# Patient Record
Sex: FEMALE | Race: WHITE | NOT HISPANIC OR LATINO | Employment: OTHER | URBAN - NONMETROPOLITAN AREA
[De-identification: names, ages, dates, MRNs, and addresses within clinical notes are randomized per-mention and may not be internally consistent; named-entity substitution may affect disease eponyms.]

---

## 2022-09-13 ENCOUNTER — APPOINTMENT (OUTPATIENT)
Dept: CT IMAGING | Facility: OTHER | Age: 72
End: 2022-09-13
Attending: PHYSICIAN ASSISTANT
Payer: COMMERCIAL

## 2022-09-13 ENCOUNTER — HOSPITAL ENCOUNTER (EMERGENCY)
Facility: OTHER | Age: 72
Discharge: HOME | End: 2022-09-13
Attending: PHYSICIAN ASSISTANT | Admitting: PHYSICIAN ASSISTANT
Payer: COMMERCIAL

## 2022-09-13 VITALS
BODY MASS INDEX: 29.91 KG/M2 | SYSTOLIC BLOOD PRESSURE: 175 MMHG | TEMPERATURE: 98.2 F | WEIGHT: 179.5 LBS | HEART RATE: 68 BPM | DIASTOLIC BLOOD PRESSURE: 90 MMHG | RESPIRATION RATE: 22 BRPM | HEIGHT: 65 IN | OXYGEN SATURATION: 99 %

## 2022-09-13 DIAGNOSIS — S09.90XA CLOSED HEAD INJURY, INITIAL ENCOUNTER: ICD-10-CM

## 2022-09-13 DIAGNOSIS — S00.83XA TRAUMATIC HEMATOMA OF FOREHEAD, INITIAL ENCOUNTER: ICD-10-CM

## 2022-09-13 DIAGNOSIS — R41.3 MEMORY LOSS: ICD-10-CM
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
## 2022-09-13 PROBLEM — I10 HTN (HYPERTENSION): Status: ACTIVE | Noted: 2020-06-14

## 2022-09-13 LAB
ALBUMIN SERPL-MCNC: 3.9 G/DL (ref 3.5–5.7)
ALP SERPL-CCNC: 89 U/L (ref 34–104)
ALT SERPL W P-5'-P-CCNC: 32 U/L (ref 7–52)
ANION GAP SERPL CALCULATED.3IONS-SCNC: 7 MMOL/L (ref 3–14)
AST SERPL W P-5'-P-CCNC: 26 U/L (ref 13–39)
BASOPHILS # BLD AUTO: 0 10E3/UL (ref 0–0.2)
BASOPHILS NFR BLD AUTO: 1 %
BILIRUB SERPL-MCNC: 0.6 MG/DL (ref 0.3–1)
BUN SERPL-MCNC: 15 MG/DL (ref 7–25)
CALCIUM SERPL-MCNC: 9.2 MG/DL (ref 8.6–10.3)
CHLORIDE BLD-SCNC: 109 MMOL/L (ref 98–107)
CO2 SERPL-SCNC: 27 MMOL/L (ref 21–31)
CREAT SERPL-MCNC: 0.79 MG/DL (ref 0.6–1.2)
CRP SERPL-MCNC: 7.2 MG/L
EOSINOPHIL # BLD AUTO: 0.3 10E3/UL (ref 0–0.7)
EOSINOPHIL NFR BLD AUTO: 6 %
ERYTHROCYTE [DISTWIDTH] IN BLOOD BY AUTOMATED COUNT: 12.7 % (ref 10–15)
ETHANOL SERPL-MCNC: <0.01 G/DL
GFR SERPL CREATININE-BSD FRML MDRD: 79 ML/MIN/1.73M2
GLUCOSE BLD-MCNC: 138 MG/DL (ref 70–105)
HCT VFR BLD AUTO: 37.9 % (ref 35–47)
HGB BLD-MCNC: 13 G/DL (ref 11.7–15.7)
HOLD SPECIMEN: NORMAL
HOLD SPECIMEN: NORMAL
IMM GRANULOCYTES # BLD: 0 10E3/UL
IMM GRANULOCYTES NFR BLD: 0 %
INR PPP: 1 (ref 0.85–1.15)
LYMPHOCYTES # BLD AUTO: 1.4 10E3/UL (ref 0.8–5.3)
LYMPHOCYTES NFR BLD AUTO: 27 %
MAGNESIUM SERPL-MCNC: 1.8 MG/DL (ref 1.9–2.7)
MCH RBC QN AUTO: 28 PG (ref 26.5–33)
MCHC RBC AUTO-ENTMCNC: 34.3 G/DL (ref 31.5–36.5)
MCV RBC AUTO: 82 FL (ref 78–100)
MONOCYTES # BLD AUTO: 0.4 10E3/UL (ref 0–1.3)
MONOCYTES NFR BLD AUTO: 7 %
NEUTROPHILS # BLD AUTO: 3 10E3/UL (ref 1.6–8.3)
NEUTROPHILS NFR BLD AUTO: 59 %
NRBC # BLD AUTO: 0 10E3/UL
NRBC BLD AUTO-RTO: 0 /100
PLATELET # BLD AUTO: 161 10E3/UL (ref 150–450)
POTASSIUM BLD-SCNC: 3.7 MMOL/L (ref 3.5–5.1)
PROT SERPL-MCNC: 6.6 G/DL (ref 6.4–8.9)
RBC # BLD AUTO: 4.64 10E6/UL (ref 3.8–5.2)
SODIUM SERPL-SCNC: 143 MMOL/L (ref 134–144)
TROPONIN I SERPL-MCNC: 2.4 PG/ML (ref 0–34)
WBC # BLD AUTO: 5.1 10E3/UL (ref 4–11)

## 2022-09-13 PROCEDURE — 82077 ASSAY SPEC XCP UR&BREATH IA: CPT | Performed by: PHYSICIAN ASSISTANT

## 2022-09-13 PROCEDURE — 82435 ASSAY OF BLOOD CHLORIDE: CPT | Performed by: PHYSICIAN ASSISTANT

## 2022-09-13 PROCEDURE — 86140 C-REACTIVE PROTEIN: CPT | Performed by: PHYSICIAN ASSISTANT

## 2022-09-13 PROCEDURE — 84484 ASSAY OF TROPONIN QUANT: CPT | Performed by: PHYSICIAN ASSISTANT

## 2022-09-13 PROCEDURE — 36415 COLL VENOUS BLD VENIPUNCTURE: CPT | Performed by: PHYSICIAN ASSISTANT

## 2022-09-13 PROCEDURE — 85610 PROTHROMBIN TIME: CPT | Performed by: PHYSICIAN ASSISTANT

## 2022-09-13 PROCEDURE — 70450 CT HEAD/BRAIN W/O DYE: CPT

## 2022-09-13 PROCEDURE — 999N000186 HC STATISTIC TRAUMA - NO PRIOR: Performed by: PHYSICIAN ASSISTANT

## 2022-09-13 PROCEDURE — 83735 ASSAY OF MAGNESIUM: CPT | Performed by: PHYSICIAN ASSISTANT

## 2022-09-13 PROCEDURE — 99283 EMERGENCY DEPT VISIT LOW MDM: CPT | Performed by: PHYSICIAN ASSISTANT

## 2022-09-13 PROCEDURE — 93005 ELECTROCARDIOGRAM TRACING: CPT | Performed by: PHYSICIAN ASSISTANT

## 2022-09-13 PROCEDURE — 93010 ELECTROCARDIOGRAM REPORT: CPT | Performed by: INTERNAL MEDICINE

## 2022-09-13 PROCEDURE — 72125 CT NECK SPINE W/O DYE: CPT

## 2022-09-13 PROCEDURE — 85025 COMPLETE CBC W/AUTO DIFF WBC: CPT | Performed by: PHYSICIAN ASSISTANT

## 2022-09-13 PROCEDURE — 99285 EMERGENCY DEPT VISIT HI MDM: CPT | Mod: 25 | Performed by: PHYSICIAN ASSISTANT

## 2022-09-13 RX ORDER — HYDROCHLOROTHIAZIDE 25 MG/1
25 TABLET ORAL
COMMUNITY
Start: 2021-06-22

## 2022-09-13 RX ORDER — DONEPEZIL HYDROCHLORIDE 10 MG/1
10 TABLET, FILM COATED ORAL
COMMUNITY
Start: 2021-06-22

## 2022-09-13 ASSESSMENT — ENCOUNTER SYMPTOMS
MUSCULOSKELETAL NEGATIVE: 1
LIGHT-HEADEDNESS: 0
CARDIOVASCULAR NEGATIVE: 1
FACIAL SWELLING: 1
NAUSEA: 0
DIZZINESS: 0
CONFUSION: 1
TREMORS: 0
HEADACHES: 0
VOMITING: 0
RESPIRATORY NEGATIVE: 1

## 2022-09-13 ASSESSMENT — ACTIVITIES OF DAILY LIVING (ADL): ADLS_ACUITY_SCORE: 35

## 2022-09-13 NOTE — ED TRIAGE NOTES
Patient arrived via private car after falling down a couple steps and hitting head on cement. Pt is Covid positive and has hx of dementia. Pt states was walking down stairs and missed last two steps. Partial trauma called. Pt pupils reactive. Confused to situation and time. Able to state she was in hospital but unaware of month and year. Pt has egg sized hematoma noted above left upper eye. States has headache but denies other pain. Pt not good historian d/t dementia and unsure of medications and allergies. Will discuss with  who is out in car d/t covid      Triage Assessment     Row Name 09/13/22 5480       Triage Assessment (Adult)    Airway WDL WDL       Respiratory WDL    Respiratory WDL WDL       Skin Circulation/Temperature WDL    Skin Circulation/Temperature WDL X  EGG SIZED HEMATOA above left eye        Cardiac WDL    Cardiac WDL WDL       Peripheral/Neurovascular WDL    Peripheral Neurovascular WDL WDL       Cognitive/Neuro/Behavioral WDL    Cognitive/Neuro/Behavioral WDL X;orientation    Level of Consciousness confused    Orientation time;situation       Pupils (CN II)    Pupil PERRLA yes    Pupil Size Left 4 mm    Pupil Size Right 4 mm       [place] Coma Scale    Best Eye Response 4-->(E4) spontaneous    Best Verbal Response 4-->(V4) confused

## 2022-09-13 NOTE — ED NOTES
Per pts  she sees Dr. Alfred in [facility]. He is unaware of any allergies but pt thought she was allergic to flagyl. Spouse unsure of medications she takes but states pt has not been taking them

## 2022-09-13 NOTE — ED PROVIDER NOTES
History     Chief Complaint   Patient presents with     Trauma     Fall     Head Injury     HPI  Ritika Ashraf is a 71 year old female who is brought to the ER via her spouse.  The patient and her spouse recently tested positive for COVID 2 days ago.  The patient reports that she was walking down the stairs and on the last 2 when she slipped and missed the last step subsequently falling forward and hitting her forehead on the cement pavement.  She denies any loss of consciousness.  Denies any headache or nausea or vomiting.  She sustained a forehead scalp hematoma to the left front forehead area.  Denies any other issues.  No balance issues.  No lightheadedness or dizziness.  No chest pain or shortness of breath.  No abdominal pain.  No visual changes.  Past medical history is significant for allergic rhinitis, hypertension, memory loss, migraine headaches, chronic sinusitis, and mechanical failure of prosthetic joint.    Allergies:  Allergies   Allergen Reactions     Flagyl [Metronidazole]        Problem List:    Patient Active Problem List    Diagnosis Date Noted     HTN (hypertension) 2020     Priority: Medium     Memory loss 2020     Priority: Medium     Mechanical failure of prosthetic joint (H) 09/15/2016     Priority: Medium     Allergic rhinitis 2007     Priority: Medium     Migraine headache 2007     Priority: Medium     Other chronic sinusitis 2007     Priority: Medium        Past Medical History:    Past Medical History:   Diagnosis Date     Calculus of kidney        Past Surgical History:    Past Surgical History:   Procedure Laterality Date     ARTHROPLASTY KNEE      2009,Total Left Knee arthroplasty     ARTHROSCOPY KNEE      2009,MANIPULATION LEFT KNEE SURGERY      SECTION      x 2     COLONOSCOPY      06,minimal diverticulosis     LAPAROSCOPIC TUBAL LIGATION      1983     OSTEOTOMY FEMUR DISTAL      06     OTHER SURGICAL HISTORY       "04,.1,CONVENTIONAL PAP SMEAR,negative     OTHER SURGICAL HISTORY      06,52047.4,XR MAMMO ACS SCREENING BILATERAL ([place]),negative     OTHER SURGICAL HISTORY      ,259230,CYSTOURETHROSCOPY,stent placed       Family History:    Family History   Problem Relation Age of Onset     Family History Negative Brother         Good Health     Arthritis Mother         Arthritis     Thyroid Disease Mother         Thyroid Disease,THYROID REMOVED     Ovarian Cancer Mother 89        Cancer-ovarian     Heart Disease Father         Heart Disease     Other - See Comments Father         Leukemia  at 62     Breast Cancer Paternal Grandmother         Cancer-breast       Social History:  Marital Status:   [2]  Social History     Tobacco Use     Smoking status: Never Smoker     Smokeless tobacco: Never Used   Substance Use Topics     Alcohol use: Not Currently     Comment: Alcoholic Drinks/day: very seldom     Drug use: Never     Types: Other     Comment: Drug use: No        Medications:    donepezil (ARICEPT) 10 MG tablet  hydrochlorothiazide (HYDRODIURIL) 25 MG tablet          Review of Systems   HENT: Positive for facial swelling.         Left frontal scalp hematoma with a tiny abrasion area   Respiratory: Negative.    Cardiovascular: Negative.    Gastrointestinal: Negative for nausea and vomiting.   Musculoskeletal: Negative.    Neurological: Negative for dizziness, tremors, syncope, light-headedness and headaches.   Psychiatric/Behavioral: Positive for confusion.   All other systems reviewed and are negative.      Physical Exam   BP: (!) 172/97  Pulse: 71  Temp: 98.2  F (36.8  C)  Resp: 22  Height: 165.1 cm (5' 5\")  Weight: 81.4 kg (179 lb 8 oz)  SpO2: 98 %    Vitals:    22 1645 22 1650 22 1700 22 1715   BP: (!) 179/88  (!) 166/85 (!) 175/90   Pulse: 73  67 68   Resp:       Temp:       TempSrc:       SpO2:  99% 99% 99%   Weight:       Height:           Physical Exam  Vitals and " nursing note reviewed.   Constitutional:       General: She is not in acute distress.     Appearance: Normal appearance. She is not ill-appearing or toxic-appearing.   HENT:      Head: Normocephalic. Abrasion and contusion present.        Comments: Left forehead scalp hematoma with a small superficial puncture wound and abrasion.  Minimal bleeding.     Nose: Nose normal.   Eyes:      General: No scleral icterus.     Extraocular Movements: Extraocular movements intact.   Neck:      Trachea: No tracheal deviation.      Comments: Patient placed in cervical collar.  Cardiovascular:      Rate and Rhythm: Normal rate.   Pulmonary:      Effort: Pulmonary effort is normal. No respiratory distress.      Breath sounds: No stridor.   Musculoskeletal:         General: Normal range of motion.   Skin:     General: Skin is warm and dry.      Coloration: Skin is not jaundiced or pale.   Neurological:      General: No focal deficit present.      Mental Status: She is alert and oriented to person, place, and time. Mental status is at baseline.   Psychiatric:         Attention and Perception: Attention normal.         Mood and Affect: Mood normal.         ED Course      EKG shows normal sinus rhythm with a heart rate of 70.    Results for orders placed or performed during the hospital encounter of 09/13/22 (from the past 24 hour(s))   CBC with platelets differential    Narrative    The following orders were created for panel order CBC with platelets differential.  Procedure                               Abnormality         Status                     ---------                               -----------         ------                     CBC with platelets and d...[[id number]]                      Final result                 Please view results for these tests on the individual orders.   INR   Result Value Ref Range    INR 1.00 0.85 - 1.15   Comprehensive metabolic panel   Result Value Ref Range    Sodium 143 134 - 144 mmol/L     Potassium 3.7 3.5 - 5.1 mmol/L    Chloride 109 (H) 98 - 107 mmol/L    Carbon Dioxide (CO2) 27 21 - 31 mmol/L    Anion Gap 7 3 - 14 mmol/L    Urea Nitrogen 15 7 - 25 mg/dL    Creatinine 0.79 0.60 - 1.20 mg/dL    Calcium 9.2 8.6 - 10.3 mg/dL    Glucose 138 (H) 70 - 105 mg/dL    Alkaline Phosphatase 89 34 - 104 U/L    AST 26 13 - 39 U/L    ALT 32 7 - 52 U/L    Protein Total 6.6 6.4 - 8.9 g/dL    Albumin 3.9 3.5 - 5.7 g/dL    Bilirubin Total 0.6 0.3 - 1.0 mg/dL    GFR Estimate 79 >60 mL/min/1.73m2   Troponin I   Result Value Ref Range    Troponin I 2.4 0.0 - 34.0 pg/mL   Magnesium   Result Value Ref Range    Magnesium 1.8 (L) 1.9 - 2.7 mg/dL   CRP inflammation   Result Value Ref Range    CRP Inflammation 7.2 <10.0 mg/L   Ethanol GH   Result Value Ref Range    Alcohol ethyl <0.01 <=0.01 g/dL   CBC with platelets and differential   Result Value Ref Range    WBC Count 5.1 4.0 - 11.0 10e3/uL    RBC Count 4.64 3.80 - 5.20 10e6/uL    Hemoglobin 13.0 11.7 - 15.7 g/dL    Hematocrit 37.9 35.0 - 47.0 %    MCV 82 78 - 100 fL    MCH 28.0 26.5 - 33.0 pg    MCHC 34.3 31.5 - 36.5 g/dL    RDW 12.7 10.0 - 15.0 %    Platelet Count 161 150 - 450 10e3/uL    % Neutrophils 59 %    % Lymphocytes 27 %    % Monocytes 7 %    % Eosinophils 6 %    % Basophils 1 %    % Immature Granulocytes 0 %    NRBCs per 100 WBC 0 <1 /100    Absolute Neutrophils 3.0 1.6 - 8.3 10e3/uL    Absolute Lymphocytes 1.4 0.8 - 5.3 10e3/uL    Absolute Monocytes 0.4 0.0 - 1.3 10e3/uL    Absolute Eosinophils 0.3 0.0 - 0.7 10e3/uL    Absolute Basophils 0.0 0.0 - 0.2 10e3/uL    Absolute Immature Granulocytes 0.0 <=0.4 10e3/uL    Absolute NRBCs 0.0 10e3/uL   Extra Tube    Narrative    The following orders were created for panel order Extra Tube.  Procedure                               Abnormality         Status                     ---------                               -----------         ------                     Extra Serum Separator Tu...[[id number]]                       Final result               Extra Blood Bank Purple ...[[id number]]                      Final result                 Please view results for these tests on the individual orders.   Extra Serum Separator Tube (SST)   Result Value Ref Range    Hold Specimen JI    Extra Blood Bank Purple Top Tube   Result Value Ref Range    Hold Specimen JIC    CT Head w/o Contrast    Narrative    EXAM: CT HEAD W/O CONTRAST 9/13/2022 4:46 PM    PROVIDED HISTORY: Trauma    COMPARISON: None    TECHNIQUE:   Imaging protocol: Multiplanar CT images of the head without  intravenous contrast.   Acquisition: This CT exam was performed using one or more the  following dose reduction techniques: automated exposure control,  adjustment of the mA and/or kV according to patient size, and/or  iterative reconstruction technique.    FINDINGS:  No intracranial hemorrhage, mass effect, or midline shift. The  ventricles are proportionate to the cerebral sulci. Scattered patchy  foci of hypodensity in the periventricular and supraventricular white  matter, which is nonspecific, likely related to chronic small vessel  ischemic disease given the patient's age. Mild general parenchymal  volume loss. No acute loss of gray-white matter differentiation.    No acute osseous abnormality. Anterior left frontal scalp subcutaneous  hematoma and emphysema. Mild paranasal sinus mucosal thickening.  Mastoid air cells are clear. Bilateral pseudophakia.       Impression    IMPRESSION:  1.  No acute intracranial abnormality.  2.  Anterior left frontal scalp laceration.    MILANA KWON MD         SYSTEM ID:  [id number]   CT Cervical Spine w/o Contrast    Narrative    EXAM: CT CERVICAL SPINE W/O CONTRAST 9/13/2022 4:47 PM    PROVIDED HISTORY: trauma    COMPARISON: None    TECHNIQUE:   Imaging protocol: Using multidetector thin collimation helical  acquisition technique, axial, coronal and sagittal CT images through  the cervical spine were obtained without intravenous  contrast.   Acquisition: This CT exam was performed using one or more the  following dose reduction techniques: automated exposure control,  adjustment of the mA and/or kV according to patient size, and/or  iterative reconstruction technique.    FINDINGS:  Trace anterolisthesis C7 on T1 and T1 and T2. Straightening of the  normal cervical lordotic curvature. No findings of acute fracture or  traumatic subluxation. No prevertebral edema. There is mild to  moderate disc height narrowing C3-C6. The findings on a level by level  basis are as follows:    C2-3: No spinal canal or neural foraminal stenosis.    C3-4:  Uncinate and facet hypertrophy. Moderate right foraminal  narrowing. Mild spinal canal and left foraminal narrowing.    C4-5:  Uncinate and facet hypertrophy. Moderate right greater than  left bilateral foraminal narrowing. Mild spinal canal narrowing.    C5-6:  Uncinate and facet hypertrophy with severe right foraminal  narrowing. Mild spinal canal or left foraminal narrowing.    C6-7:  Uncinate and facet hypertrophy. Mild bilateral foraminal  narrowing. No spinal canal narrowing    C7-T1:  No spinal canal or neural foraminal stenosis.     No acute abnormality of the paraspinous soft tissues. Multinodular  thyroid with largest nodule on the right measuring 1.4 cm and largest  nodule on the left measuring 1.8 cm.      Impression    IMPRESSION:   1.  No acute fracture or traumatic subluxation.  2.  Multilevel cervical spondylosis, greatest at C5-6 where there is  severe right foraminal narrowing.  3.  1.8 cm left thyroid nodule, consider nonemergent follow-up  outpatient thyroid ultrasound.    MILANA KWON MD         SYSTEM ID:  [id number]       Medications - No data to display    Assessments & Plan (with Medical Decision Making)     I have reviewed the nursing notes.    I have reviewed the findings, diagnosis, plan and need for follow up with the patient.      Discharge Medication List as of 9/13/2022  5:39 PM           Final diagnoses:   Fall down stairs, initial encounter   Closed head injury, initial encounter   Traumatic hematoma of forehead, initial encounter   Memory loss - possible dementia     Ritika Ashraf is a 71 year old female who is brought to the ER via her spouse.  The patient and her spouse recently tested positive for COVID 2 days ago.  The patient reports that she was walking down the stairs and on the last 2 when she slipped and missed the last step subsequently falling forward and hitting her forehead on the cement pavement.  She denies any loss of consciousness.  Denies any headache or nausea or vomiting.  She sustained a forehead scalp hematoma to the left front forehead area.  Denies any other issues.  No balance issues.  No lightheadedness or dizziness.  No chest pain or shortness of breath.  No abdominal pain.  No visual changes.  Partial trauma activated based on patient's age.  She was placed in cervical collar in triage.  Physical examination shows a left forehead scalp hematoma with a small superficial puncture wound and abrasion.  Minimal bleeding.  No neurological deficits.  Patient has some underlying memory loss and dementia but family believes she is at baseline.  CT of the patient's head and cervical area show no acute findings.  Her cervical collar was removed.  CBC shows normal white blood cells no left shift.  CMP shows a glucose at 138 but otherwise unremarkable.  CRP is normal.  EtOH is normal.  EKG shows normal sinus rhythm with a heart rate of 70.  Troponin is normal.  INR is normal.  Patient's magnesium returned slightly decreased at 1.8.  She was observed over extended timeframe with no deterioration in her condition.  Her scalp hematoma has a small superficial puncture wound however I do not feel this justifies tetanus booster at this time.  I discussed ice for her scalp hematoma.  Continue to monitor symptoms return if there is any concerns for further evaluation as needed.   Otherwise she is from the [facility] and she can return with her primary care provider there as needed.  I explained my diagnostic considerations and recommendations and the patient voiced an understanding and was in agreement with the treatment plan. All questions were answered to the best of my ability.  We discussed potential side effects of any prescribed or recommended therapies, as well as expectations for response to treatments.      9/13/2022   [facility] [place]     Venkat Sharp PA-C  09/13/22 2226

## 2022-09-14 LAB
ATRIAL RATE - MUSE: 70 BPM
DIASTOLIC BLOOD PRESSURE - MUSE: NORMAL MMHG
INTERPRETATION ECG - MUSE: NORMAL
P AXIS - MUSE: 12 DEGREES
PR INTERVAL - MUSE: 158 MS
QRS DURATION - MUSE: 86 MS
QT - MUSE: 404 MS
QTC - MUSE: 436 MS
R AXIS - MUSE: 8 DEGREES
SYSTOLIC BLOOD PRESSURE - MUSE: NORMAL MMHG
T AXIS - MUSE: 6 DEGREES
VENTRICULAR RATE- MUSE: 70 BPM

## 2022-10-01 ENCOUNTER — HEALTH MAINTENANCE LETTER (OUTPATIENT)
Age: 72
End: 2022-10-01

## 2023-10-21 ENCOUNTER — HEALTH MAINTENANCE LETTER (OUTPATIENT)
Age: 73
End: 2023-10-21

## 2024-12-08 ENCOUNTER — HEALTH MAINTENANCE LETTER (OUTPATIENT)
Age: 74
End: 2024-12-08